# Patient Record
Sex: FEMALE | Race: OTHER | HISPANIC OR LATINO
[De-identification: names, ages, dates, MRNs, and addresses within clinical notes are randomized per-mention and may not be internally consistent; named-entity substitution may affect disease eponyms.]

---

## 2017-06-27 ENCOUNTER — APPOINTMENT (OUTPATIENT)
Dept: SURGICAL ONCOLOGY | Facility: CLINIC | Age: 22
End: 2017-06-27

## 2017-06-28 PROBLEM — Z00.00 ENCOUNTER FOR PREVENTIVE HEALTH EXAMINATION: Status: ACTIVE | Noted: 2017-06-28

## 2017-07-18 ENCOUNTER — APPOINTMENT (OUTPATIENT)
Dept: SURGICAL ONCOLOGY | Facility: CLINIC | Age: 22
End: 2017-07-18

## 2017-07-20 ENCOUNTER — OUTPATIENT (OUTPATIENT)
Dept: OUTPATIENT SERVICES | Facility: HOSPITAL | Age: 22
LOS: 1 days | End: 2017-07-20
Payer: MEDICAID

## 2017-07-20 VITALS
DIASTOLIC BLOOD PRESSURE: 76 MMHG | RESPIRATION RATE: 18 BRPM | SYSTOLIC BLOOD PRESSURE: 113 MMHG | WEIGHT: 160.06 LBS | TEMPERATURE: 98 F | OXYGEN SATURATION: 100 % | HEIGHT: 64 IN | HEART RATE: 74 BPM

## 2017-07-20 DIAGNOSIS — Z01.818 ENCOUNTER FOR OTHER PREPROCEDURAL EXAMINATION: ICD-10-CM

## 2017-07-20 DIAGNOSIS — C73 MALIGNANT NEOPLASM OF THYROID GLAND: ICD-10-CM

## 2017-07-20 LAB
ANION GAP SERPL CALC-SCNC: 5 MMOL/L — SIGNIFICANT CHANGE UP (ref 5–17)
APTT BLD: 32.7 SEC — SIGNIFICANT CHANGE UP (ref 27.5–37.4)
BUN SERPL-MCNC: 10 MG/DL — SIGNIFICANT CHANGE UP (ref 7–18)
CALCIUM SERPL-MCNC: 8.6 MG/DL — SIGNIFICANT CHANGE UP (ref 8.4–10.5)
CHLORIDE SERPL-SCNC: 106 MMOL/L — SIGNIFICANT CHANGE UP (ref 96–108)
CO2 SERPL-SCNC: 27 MMOL/L — SIGNIFICANT CHANGE UP (ref 22–31)
CREAT SERPL-MCNC: 0.55 MG/DL — SIGNIFICANT CHANGE UP (ref 0.5–1.3)
GLUCOSE SERPL-MCNC: 84 MG/DL — SIGNIFICANT CHANGE UP (ref 70–99)
HCG SERPL-ACNC: <1 MIU/ML — SIGNIFICANT CHANGE UP
HCT VFR BLD CALC: 38.8 % — SIGNIFICANT CHANGE UP (ref 34.5–45)
HGB BLD-MCNC: 12.5 G/DL — SIGNIFICANT CHANGE UP (ref 11.5–15.5)
INR BLD: 1.04 RATIO — SIGNIFICANT CHANGE UP (ref 0.88–1.16)
MCHC RBC-ENTMCNC: 28.4 PG — SIGNIFICANT CHANGE UP (ref 27–34)
MCHC RBC-ENTMCNC: 32.1 GM/DL — SIGNIFICANT CHANGE UP (ref 32–36)
MCV RBC AUTO: 88.4 FL — SIGNIFICANT CHANGE UP (ref 80–100)
PLATELET # BLD AUTO: 209 K/UL — SIGNIFICANT CHANGE UP (ref 150–400)
POTASSIUM SERPL-MCNC: 4.1 MMOL/L — SIGNIFICANT CHANGE UP (ref 3.5–5.3)
POTASSIUM SERPL-SCNC: 4.1 MMOL/L — SIGNIFICANT CHANGE UP (ref 3.5–5.3)
PROTHROM AB SERPL-ACNC: 11.3 SEC — SIGNIFICANT CHANGE UP (ref 9.8–12.7)
RBC # BLD: 4.4 M/UL — SIGNIFICANT CHANGE UP (ref 3.8–5.2)
RBC # FLD: 12.4 % — SIGNIFICANT CHANGE UP (ref 10.3–14.5)
SODIUM SERPL-SCNC: 138 MMOL/L — SIGNIFICANT CHANGE UP (ref 135–145)
T3 SERPL-MCNC: 121 NG/DL — SIGNIFICANT CHANGE UP (ref 80–200)
T4 AB SER-ACNC: 9.6 UG/DL — SIGNIFICANT CHANGE UP (ref 4.6–12)
TSH SERPL-MCNC: 0.94 UU/ML — SIGNIFICANT CHANGE UP (ref 0.34–4.82)
WBC # BLD: 6.8 K/UL — SIGNIFICANT CHANGE UP (ref 3.8–10.5)
WBC # FLD AUTO: 6.8 K/UL — SIGNIFICANT CHANGE UP (ref 3.8–10.5)

## 2017-07-20 PROCEDURE — 80048 BASIC METABOLIC PNL TOTAL CA: CPT

## 2017-07-20 PROCEDURE — 85027 COMPLETE CBC AUTOMATED: CPT

## 2017-07-20 PROCEDURE — 71046 X-RAY EXAM CHEST 2 VIEWS: CPT

## 2017-07-20 PROCEDURE — 84436 ASSAY OF TOTAL THYROXINE: CPT

## 2017-07-20 PROCEDURE — 86900 BLOOD TYPING SEROLOGIC ABO: CPT

## 2017-07-20 PROCEDURE — 84702 CHORIONIC GONADOTROPIN TEST: CPT

## 2017-07-20 PROCEDURE — 84443 ASSAY THYROID STIM HORMONE: CPT

## 2017-07-20 PROCEDURE — 86901 BLOOD TYPING SEROLOGIC RH(D): CPT

## 2017-07-20 PROCEDURE — 84480 ASSAY TRIIODOTHYRONINE (T3): CPT

## 2017-07-20 PROCEDURE — 93005 ELECTROCARDIOGRAM TRACING: CPT

## 2017-07-20 PROCEDURE — 85610 PROTHROMBIN TIME: CPT

## 2017-07-20 PROCEDURE — 85730 THROMBOPLASTIN TIME PARTIAL: CPT

## 2017-07-20 PROCEDURE — 71020: CPT | Mod: 26

## 2017-07-20 PROCEDURE — G0463: CPT

## 2017-07-20 PROCEDURE — 86850 RBC ANTIBODY SCREEN: CPT

## 2017-07-20 RX ORDER — SODIUM CHLORIDE 9 MG/ML
3 INJECTION INTRAMUSCULAR; INTRAVENOUS; SUBCUTANEOUS EVERY 8 HOURS
Qty: 0 | Refills: 0 | Status: DISCONTINUED | OUTPATIENT
Start: 2017-07-21 | End: 2017-07-21

## 2017-07-20 NOTE — H&P PST ADULT - NECK DETAILS
Malignant neoplasm of thyroid gland - tenderness upon palpation to right neck area/no JVD/thyroid nodule/normal

## 2017-07-20 NOTE — H&P PST ADULT - HISTORY OF PRESENT ILLNESS
21year old female with pmhx of asthma as a child presents today for presurgical testing for scheduled total thyroidectomy on 7/21/17

## 2017-07-20 NOTE — H&P PST ADULT - FAMILY HISTORY
Father  Still living? Unknown  Family history of diabetes mellitus, Age at diagnosis: Age Unknown     Mother  Still living? Unknown  Family history of heart disease, Age at diagnosis: Age Unknown

## 2017-07-20 NOTE — H&P PST ADULT - NSANTHOSAYNRD_GEN_A_CORE
No. SILVERIO screening performed.  STOP BANG Legend: 0-2 = LOW Risk; 3-4 = INTERMEDIATE Risk; 5-8 = HIGH Risk

## 2017-07-21 ENCOUNTER — INPATIENT (INPATIENT)
Facility: HOSPITAL | Age: 22
LOS: 0 days | Discharge: HOME | DRG: 627 | End: 2017-07-22
Attending: SPECIALIST | Admitting: SPECIALIST
Payer: MEDICAID

## 2017-07-21 ENCOUNTER — TRANSCRIPTION ENCOUNTER (OUTPATIENT)
Age: 22
End: 2017-07-21

## 2017-07-21 ENCOUNTER — APPOINTMENT (OUTPATIENT)
Dept: SURGICAL ONCOLOGY | Facility: HOSPITAL | Age: 22
End: 2017-07-21
Payer: MEDICAID

## 2017-07-21 ENCOUNTER — RESULT REVIEW (OUTPATIENT)
Age: 22
End: 2017-07-21

## 2017-07-21 VITALS
OXYGEN SATURATION: 100 % | HEIGHT: 64 IN | SYSTOLIC BLOOD PRESSURE: 106 MMHG | RESPIRATION RATE: 16 BRPM | DIASTOLIC BLOOD PRESSURE: 63 MMHG | WEIGHT: 160.06 LBS | TEMPERATURE: 99 F | HEART RATE: 79 BPM

## 2017-07-21 DIAGNOSIS — Z01.818 ENCOUNTER FOR OTHER PREPROCEDURAL EXAMINATION: ICD-10-CM

## 2017-07-21 DIAGNOSIS — C73 MALIGNANT NEOPLASM OF THYROID GLAND: ICD-10-CM

## 2017-07-21 PROCEDURE — 60240 REMOVAL OF THYROID: CPT

## 2017-07-21 PROCEDURE — 88307 TISSUE EXAM BY PATHOLOGIST: CPT | Mod: 26

## 2017-07-21 RX ORDER — HYDROMORPHONE HYDROCHLORIDE 2 MG/ML
0.5 INJECTION INTRAMUSCULAR; INTRAVENOUS; SUBCUTANEOUS
Qty: 0 | Refills: 0 | Status: DISCONTINUED | OUTPATIENT
Start: 2017-07-21 | End: 2017-07-21

## 2017-07-21 RX ORDER — SODIUM CHLORIDE 9 MG/ML
3 INJECTION INTRAMUSCULAR; INTRAVENOUS; SUBCUTANEOUS EVERY 8 HOURS
Qty: 0 | Refills: 0 | Status: DISCONTINUED | OUTPATIENT
Start: 2017-07-21 | End: 2017-07-22

## 2017-07-21 RX ORDER — CALCITRIOL 0.5 UG/1
0.25 CAPSULE ORAL
Qty: 0 | Refills: 0 | Status: DISCONTINUED | OUTPATIENT
Start: 2017-07-21 | End: 2017-07-22

## 2017-07-21 RX ORDER — ONDANSETRON 8 MG/1
4 TABLET, FILM COATED ORAL ONCE
Qty: 0 | Refills: 0 | Status: DISCONTINUED | OUTPATIENT
Start: 2017-07-21 | End: 2017-07-21

## 2017-07-21 RX ORDER — OXYCODONE AND ACETAMINOPHEN 5; 325 MG/1; MG/1
1 TABLET ORAL EVERY 4 HOURS
Qty: 0 | Refills: 0 | Status: DISCONTINUED | OUTPATIENT
Start: 2017-07-21 | End: 2017-07-22

## 2017-07-21 RX ORDER — ACETAMINOPHEN 500 MG
1000 TABLET ORAL ONCE
Qty: 0 | Refills: 0 | Status: COMPLETED | OUTPATIENT
Start: 2017-07-21 | End: 2017-07-21

## 2017-07-21 RX ORDER — CALCIUM CARBONATE 500(1250)
1 TABLET ORAL
Qty: 0 | Refills: 0 | Status: DISCONTINUED | OUTPATIENT
Start: 2017-07-22 | End: 2017-07-22

## 2017-07-21 RX ORDER — SODIUM CHLORIDE 9 MG/ML
1000 INJECTION, SOLUTION INTRAVENOUS
Qty: 0 | Refills: 0 | Status: DISCONTINUED | OUTPATIENT
Start: 2017-07-21 | End: 2017-07-21

## 2017-07-21 RX ORDER — LEVOTHYROXINE SODIUM 125 MCG
150 TABLET ORAL DAILY
Qty: 0 | Refills: 0 | Status: DISCONTINUED | OUTPATIENT
Start: 2017-07-22 | End: 2017-07-22

## 2017-07-21 RX ADMIN — SODIUM CHLORIDE 100 MILLILITER(S): 9 INJECTION, SOLUTION INTRAVENOUS at 14:45

## 2017-07-21 RX ADMIN — HYDROMORPHONE HYDROCHLORIDE 0.5 MILLIGRAM(S): 2 INJECTION INTRAMUSCULAR; INTRAVENOUS; SUBCUTANEOUS at 14:18

## 2017-07-21 RX ADMIN — CALCITRIOL 0.25 MICROGRAM(S): 0.5 CAPSULE ORAL at 17:53

## 2017-07-21 RX ADMIN — OXYCODONE AND ACETAMINOPHEN 1 TABLET(S): 5; 325 TABLET ORAL at 19:44

## 2017-07-21 RX ADMIN — HYDROMORPHONE HYDROCHLORIDE 0.5 MILLIGRAM(S): 2 INJECTION INTRAMUSCULAR; INTRAVENOUS; SUBCUTANEOUS at 14:48

## 2017-07-21 RX ADMIN — SODIUM CHLORIDE 3 MILLILITER(S): 9 INJECTION INTRAMUSCULAR; INTRAVENOUS; SUBCUTANEOUS at 09:30

## 2017-07-21 RX ADMIN — OXYCODONE AND ACETAMINOPHEN 1 TABLET(S): 5; 325 TABLET ORAL at 20:30

## 2017-07-21 RX ADMIN — Medication 400 MILLIGRAM(S): at 11:30

## 2017-07-21 RX ADMIN — Medication 1000 MILLIGRAM(S): at 12:00

## 2017-07-21 RX ADMIN — SODIUM CHLORIDE 3 MILLILITER(S): 9 INJECTION INTRAMUSCULAR; INTRAVENOUS; SUBCUTANEOUS at 21:27

## 2017-07-21 NOTE — PROGRESS NOTE ADULT - SUBJECTIVE AND OBJECTIVE BOX
Surgery Post-op Check    Patient seen and examined. She is POD #0 from total thyroidectomy. She reports doing well, has some pain that is controlled with current regimen, is tolerating clear liquid diet, denies nausea or vomiting, has ambulated to bathroom, voiding without difficultly, no BM or flatus. She reports some pain with speaking but normal sounding voice. No respiratory difficulty.    Vital Signs Last 24 Hrs  T(C): 36.7 (21 Jul 2017 15:44), Max: 37.3 (21 Jul 2017 13:26)  T(F): 98.1 (21 Jul 2017 15:44), Max: 99.1 (21 Jul 2017 13:26)  HR: 93 (21 Jul 2017 15:44) (77 - 93)  BP: 105/58 (21 Jul 2017 15:44) (100/65 - 112/67)  BP(mean): 80 (21 Jul 2017 14:48) (76 - 84)  RR: 15 (21 Jul 2017 15:44) (14 - 19)  SpO2: 100% (21 Jul 2017 15:44) (100% - 100%)    PE:  Gen- AAO x3, NAD  Neck- dressing in place, steri-strips clean dry, intact, no signs of hematoma, no surrounding erythema or discharge  Chest- CTAB, RRR  Abd- soft, NT/ND  Extrem- no calf tenderness    A/P  20yo F s/p total thyroidectomy, doing well  - cont w clear liquids  - calcitriol, synthroid, calcium  - encouraged OOB/ IS

## 2017-07-21 NOTE — PATIENT PROFILE ADULT. - TEACHING/LEARNING LEARNING PREFERENCES
skill demonstration/computer/internet/audio/video/verbal instruction/group instruction/written material/individual instruction/pictorial

## 2017-07-21 NOTE — BRIEF OPERATIVE NOTE - OPERATION/FINDINGS
palpable mass in right inferior pole, recurrent laryngeal nerve identified bilaterally and protected

## 2017-07-22 ENCOUNTER — TRANSCRIPTION ENCOUNTER (OUTPATIENT)
Age: 22
End: 2017-07-22

## 2017-07-22 VITALS
OXYGEN SATURATION: 100 % | RESPIRATION RATE: 16 BRPM | HEART RATE: 76 BPM | TEMPERATURE: 98 F | DIASTOLIC BLOOD PRESSURE: 65 MMHG | SYSTOLIC BLOOD PRESSURE: 102 MMHG

## 2017-07-22 DIAGNOSIS — G89.18 OTHER ACUTE POSTPROCEDURAL PAIN: ICD-10-CM

## 2017-07-22 DIAGNOSIS — E89.0 POSTPROCEDURAL HYPOTHYROIDISM: ICD-10-CM

## 2017-07-22 DIAGNOSIS — C73 MALIGNANT NEOPLASM OF THYROID GLAND: ICD-10-CM

## 2017-07-22 PROCEDURE — 99233 SBSQ HOSP IP/OBS HIGH 50: CPT

## 2017-07-22 RX ORDER — CALCIUM CARBONATE 500(1250)
1 TABLET ORAL
Qty: 0 | Refills: 0 | COMMUNITY
Start: 2017-07-22

## 2017-07-22 RX ORDER — CALCIUM CARBONATE 500(1250)
1 TABLET ORAL
Qty: 60 | Refills: 0 | OUTPATIENT
Start: 2017-07-22 | End: 2017-08-21

## 2017-07-22 RX ORDER — OXYCODONE HYDROCHLORIDE 5 MG/1
1 TABLET ORAL
Qty: 18 | Refills: 0 | OUTPATIENT
Start: 2017-07-22 | End: 2017-07-25

## 2017-07-22 RX ORDER — CALCITRIOL 0.5 UG/1
1 CAPSULE ORAL
Qty: 60 | Refills: 0 | OUTPATIENT
Start: 2017-07-22 | End: 2017-08-21

## 2017-07-22 RX ORDER — LEVOTHYROXINE SODIUM 125 MCG
1 TABLET ORAL
Qty: 30 | Refills: 0 | OUTPATIENT
Start: 2017-07-22 | End: 2017-08-21

## 2017-07-22 RX ORDER — OXYCODONE HYDROCHLORIDE 5 MG/1
1 TABLET ORAL
Qty: 0 | Refills: 0 | COMMUNITY
Start: 2017-07-22

## 2017-07-22 RX ORDER — LEVOTHYROXINE SODIUM 125 MCG
1 TABLET ORAL
Qty: 0 | Refills: 0 | COMMUNITY
Start: 2017-07-22

## 2017-07-22 RX ORDER — CALCITRIOL 0.5 UG/1
1 CAPSULE ORAL
Qty: 0 | Refills: 0 | COMMUNITY
Start: 2017-07-22

## 2017-07-22 RX ADMIN — OXYCODONE AND ACETAMINOPHEN 1 TABLET(S): 5; 325 TABLET ORAL at 06:15

## 2017-07-22 RX ADMIN — Medication 150 MICROGRAM(S): at 06:10

## 2017-07-22 RX ADMIN — SODIUM CHLORIDE 3 MILLILITER(S): 9 INJECTION INTRAMUSCULAR; INTRAVENOUS; SUBCUTANEOUS at 06:21

## 2017-07-22 RX ADMIN — OXYCODONE AND ACETAMINOPHEN 1 TABLET(S): 5; 325 TABLET ORAL at 07:01

## 2017-07-22 RX ADMIN — CALCITRIOL 0.25 MICROGRAM(S): 0.5 CAPSULE ORAL at 06:10

## 2017-07-22 RX ADMIN — Medication 1 TABLET(S): at 06:11

## 2017-07-22 NOTE — DISCHARGE NOTE ADULT - PLAN OF CARE
Pain management pain medication as needed Pain management, wound healing pain medication as needed, Follow up with Dr. Castillo in 1 week in office

## 2017-07-22 NOTE — PROGRESS NOTE ADULT - SUBJECTIVE AND OBJECTIVE BOX
Vital Signs Last 24 Hrs  T(C): 36.9 (22 Jul 2017 05:46), Max: 37.3 (21 Jul 2017 13:26)  T(F): 98.5 (22 Jul 2017 05:46), Max: 99.1 (21 Jul 2017 13:26)  HR: 76 (22 Jul 2017 05:46) (74 - 93)  BP: 102/65 (22 Jul 2017 05:46) (100/65 - 117/78)  BP(mean): 80 (21 Jul 2017 14:48) (76 - 84)  RR: 16 (22 Jul 2017 05:46) (14 - 19)  SpO2: 100% (22 Jul 2017 05:46) (99% - 100%)    I&O's Detail    21 Jul 2017 07:01  -  22 Jul 2017 07:00  --------------------------------------------------------  IN:    Lactated Ringers IV Bolus: 1400 mL    lactated ringers.: 100 mL  Total IN: 1500 mL    OUT:  Total OUT: 0 mL    Total NET: 1500 mL                                12.5   6.8   )-----------( 209      ( 20 Jul 2017 11:51 )             38.8       07-20    138  |  106  |  10  ----------------------------<  84  4.1   |  27  |  0.55    Ca    8.6      20 Jul 2017 11:51        PT/INR - ( 20 Jul 2017 11:51 )   PT: 11.3 sec;   INR: 1.04 ratio         PTT - ( 20 Jul 2017 11:51 )  PTT:32.7 sec    doing well  negative chvostek    PLAN:  Discharge home today on synthroid, Vit D and calcium  Instructions given

## 2017-07-22 NOTE — PROGRESS NOTE ADULT - SUBJECTIVE AND OBJECTIVE BOX
Surgery Daily Note    Patient seen and examined at bedside, both parents at bedside as well. She is POD #1 from total thyroidectomy for papillary thyroid cancer. She reports doing well with no issues overnight, still having some pain at surgical site but improved from last night. She has been ambulating, voiding, no BM, denies nausea or vomiting, tolerating clear liquid diet, normal voice, only with some pain with speaking. No SOB or respiratory difficulty    Vital Signs Last 24 Hrs:  T(C): 36.9 (22 Jul 2017 05:46), Max: 37.3 (21 Jul 2017 13:26)  T(F): 98.5 (22 Jul 2017 05:46), Max: 99.1 (21 Jul 2017 13:26)  HR: 76 (22 Jul 2017 05:46) (74 - 93)  BP: 102/65 (22 Jul 2017 05:46) (100/65 - 117/78)  BP(mean): 80 (21 Jul 2017 14:48) (76 - 84)  RR: 16 (22 Jul 2017 05:46) (14 - 19)  SpO2: 100% (22 Jul 2017 05:46) (99% - 100%)    PE:  Gen- AAO x3, NAD  Neck- dressing remains in place, steri strips are clean and dry, no surrounding erythema or discharge, no hematoma, (+) yasmany-incisional tenderness  Chest- CTAB, equal chest rise, RRR  Abd- soft, NT/ND  Extrem- no calf tenderness    A/P  22 yo F POD #1 from total thyroidectomy, doing well  - cont current pain regimen  - cont oscal, synthroid, calcitriol  - encouraged OOB/ IS  - plan for d/c later today

## 2017-07-22 NOTE — DISCHARGE NOTE ADULT - CARE PROVIDER_API CALL
Ryan Castillo (MD), Surgery  [id number] [address] [place] [address]  Phone: [phone]  Fax: [phone]

## 2017-07-22 NOTE — DISCHARGE NOTE ADULT - CARE PLAN
Principal Discharge DX:	Thyroid cancer  Goal:	Pain management  Instructions for follow-up, activity and diet:	pain medication as needed Principal Discharge DX:	Thyroid cancer  Goal:	Pain management, wound healing  Instructions for follow-up, activity and diet:	pain medication as needed, Follow up with Dr. Castillo in 1 week in office

## 2017-07-22 NOTE — PROGRESS NOTE ADULT - SUBJECTIVE AND OBJECTIVE BOX
Chief Complaint: neck pain    HPI:   21y Female s/p total thyroidectomy for papillary ca, POD#1.  Pt is lying in bed, NAD. + neck discomfort.  Pt complains of pain on swallowing.  Pt able to drink liquids this am. No sob or chest pain.  For discharge home today.        PAIN SCORE:     4/10  SCALE USED: (1-10 VNRS)    Allergies    penicillin (Anaphylaxis)  PINEAPLLE (Anaphylaxis)    Intolerances      MEDICATIONS  (STANDING):  sodium chloride 0.9% lock flush 3 milliLiter(s) IV Push every 8 hours  levothyroxine 150 MICROGram(s) Oral daily  calcitriol   Capsule 0.25 MICROGram(s) Oral two times a day  calcium carbonate 1250 mG (OsCal) 1 Tablet(s) Oral two times a day    MEDICATIONS  (PRN):  oxyCODONE    5 mG/acetaminophen 325 mG 1 Tablet(s) Oral every 4 hours PRN Moderate Pain      PHYSICAL EXAM:    Vital Signs Last 24 Hrs  T(C): 36.9 (22 Jul 2017 05:46), Max: 37.3 (21 Jul 2017 13:26)  T(F): 98.5 (22 Jul 2017 05:46), Max: 99.1 (21 Jul 2017 13:26)  HR: 76 (22 Jul 2017 05:46) (74 - 93)  BP: 102/65 (22 Jul 2017 05:46) (100/65 - 117/78)  BP(mean): 80 (21 Jul 2017 14:48) (76 - 84)  RR: 16 (22 Jul 2017 05:46) (14 - 19)  SpO2: 100% (22 Jul 2017 05:46) (99% - 100%)             LABS:                          12.5   6.8   )-----------( 209      ( 20 Jul 2017 11:51 )             38.8     07-20    138  |  106  |  10  ----------------------------<  84  4.1   |  27  |  0.55    Ca    8.6      20 Jul 2017 11:51      PT/INR - ( 20 Jul 2017 11:51 )   PT: 11.3 sec;   INR: 1.04 ratio         PTT - ( 20 Jul 2017 11:51 )  PTT:32.7 sec      Drug Screen:        RADIOLOGY:

## 2017-07-22 NOTE — DISCHARGE NOTE ADULT - PATIENT PORTAL LINK FT
“You can access the FollowHealth Patient Portal, offered by [facility], by registering with the following website: [email or web address]”

## 2017-07-22 NOTE — DISCHARGE NOTE ADULT - INSTRUCTIONS
Recommend starting with soft diet, may advance diet as tolerated. Light activity until follow up appointment

## 2017-07-22 NOTE — DISCHARGE NOTE ADULT - MEDICATION SUMMARY - MEDICATIONS TO TAKE
I will START or STAY ON the medications listed below when I get home from the hospital:    acetaminophen-oxycodone 325 mg-5 mg oral tablet  -- 1 tab(s) by mouth every 4 hours, As needed, Moderate Pain  -- Indication: For pain medication as needed    calcium carbonate 1250 mg (500 mg elemental calcium) oral tablet  -- 1 tab(s) by mouth 2 times a day  -- Indication: For after thyroid removal    levothyroxine 150 mcg (0.15 mg) oral tablet  -- 1 tab(s) by mouth once a day  -- Indication: For after thyroid removal    calcitriol 0.25 mcg oral capsule  -- 1 cap(s) by mouth 2 times a day  -- Indication: For after thyroid removal I will START or STAY ON the medications listed below when I get home from the hospital:    acetaminophen-oxycodone 325 mg-5 mg oral tablet  -- 1 tab(s) by mouth every 4 hours, As needed, Moderate Pain MDD:4  -- Indication: For pain     calcium carbonate 1250 mg (500 mg elemental calcium) oral tablet  -- 1 tab(s) by mouth 2 times a day  -- Indication: For s/p thyroidectomy     levothyroxine 150 mcg (0.15 mg) oral tablet  -- 1 tab(s) by mouth once a day  -- Indication: For s/p thyroidectomy     calcitriol 0.25 mcg oral capsule  -- 1 cap(s) by mouth 2 times a day  -- Indication: For s/p thyroidectomy

## 2017-07-22 NOTE — DISCHARGE NOTE ADULT - HOSPITAL COURSE
Patient was admitted and taken to operating room for elective total thyroidectomy. Post op diet was advanced which she tolerated, she ambulated and was voiding on her own, pain controlled on oral pain medication. She was started on calcium, synthroid, and calcitriol post op. No issues overnight and was cleared for discharge.

## 2017-07-25 DIAGNOSIS — G89.18 OTHER ACUTE POSTPROCEDURAL PAIN: ICD-10-CM

## 2017-07-25 DIAGNOSIS — C73 MALIGNANT NEOPLASM OF THYROID GLAND: ICD-10-CM

## 2017-07-25 DIAGNOSIS — Z88.0 ALLERGY STATUS TO PENICILLIN: ICD-10-CM

## 2017-07-25 DIAGNOSIS — Z91.018 ALLERGY TO OTHER FOODS: ICD-10-CM

## 2017-07-25 LAB — SURGICAL PATHOLOGY FINAL REPORT - CH: SIGNIFICANT CHANGE UP

## 2017-08-01 ENCOUNTER — APPOINTMENT (OUTPATIENT)
Dept: SURGICAL ONCOLOGY | Facility: CLINIC | Age: 22
End: 2017-08-01
Payer: MEDICAID

## 2017-08-01 PROCEDURE — 99024 POSTOP FOLLOW-UP VISIT: CPT

## 2017-11-14 ENCOUNTER — APPOINTMENT (OUTPATIENT)
Dept: SURGICAL ONCOLOGY | Facility: CLINIC | Age: 22
End: 2017-11-14
Payer: MEDICAID

## 2017-11-14 PROCEDURE — 99214 OFFICE O/P EST MOD 30 MIN: CPT

## 2017-12-15 PROCEDURE — 86850 RBC ANTIBODY SCREEN: CPT

## 2017-12-15 PROCEDURE — 88307 TISSUE EXAM BY PATHOLOGIST: CPT

## 2017-12-15 PROCEDURE — 86900 BLOOD TYPING SEROLOGIC ABO: CPT

## 2017-12-15 PROCEDURE — 36415 COLL VENOUS BLD VENIPUNCTURE: CPT

## 2017-12-15 PROCEDURE — C1889: CPT

## 2017-12-15 PROCEDURE — 86901 BLOOD TYPING SEROLOGIC RH(D): CPT

## 2018-03-05 ENCOUNTER — EMERGENCY (EMERGENCY)
Facility: HOSPITAL | Age: 23
LOS: 1 days | Discharge: HOME | End: 2018-03-05
Attending: EMERGENCY MEDICINE
Payer: MEDICAID

## 2018-03-05 VITALS
OXYGEN SATURATION: 100 % | RESPIRATION RATE: 18 BRPM | SYSTOLIC BLOOD PRESSURE: 114 MMHG | HEART RATE: 73 BPM | DIASTOLIC BLOOD PRESSURE: 64 MMHG | TEMPERATURE: 98 F

## 2018-03-05 VITALS
DIASTOLIC BLOOD PRESSURE: 66 MMHG | OXYGEN SATURATION: 100 % | TEMPERATURE: 98 F | RESPIRATION RATE: 20 BRPM | HEIGHT: 62.99 IN | SYSTOLIC BLOOD PRESSURE: 116 MMHG | WEIGHT: 156.97 LBS | HEART RATE: 86 BPM

## 2018-03-05 PROCEDURE — 96375 TX/PRO/DX INJ NEW DRUG ADDON: CPT | Mod: 76

## 2018-03-05 PROCEDURE — 99284 EMERGENCY DEPT VISIT MOD MDM: CPT | Mod: 25

## 2018-03-05 PROCEDURE — 99285 EMERGENCY DEPT VISIT HI MDM: CPT

## 2018-03-05 PROCEDURE — 96374 THER/PROPH/DIAG INJ IV PUSH: CPT

## 2018-03-05 PROCEDURE — 70450 CT HEAD/BRAIN W/O DYE: CPT | Mod: 26

## 2018-03-05 PROCEDURE — 70450 CT HEAD/BRAIN W/O DYE: CPT

## 2018-03-05 RX ORDER — KETOROLAC TROMETHAMINE 30 MG/ML
30 SYRINGE (ML) INJECTION ONCE
Qty: 0 | Refills: 0 | Status: DISCONTINUED | OUTPATIENT
Start: 2018-03-05 | End: 2018-03-05

## 2018-03-05 RX ORDER — MAGNESIUM SULFATE 500 MG/ML
1 VIAL (ML) INJECTION ONCE
Qty: 0 | Refills: 0 | Status: COMPLETED | OUTPATIENT
Start: 2018-03-05 | End: 2018-03-05

## 2018-03-05 RX ORDER — ACETAMINOPHEN 500 MG
975 TABLET ORAL ONCE
Qty: 0 | Refills: 0 | Status: COMPLETED | OUTPATIENT
Start: 2018-03-05 | End: 2018-03-05

## 2018-03-05 RX ORDER — METOCLOPRAMIDE HCL 10 MG
10 TABLET ORAL ONCE
Qty: 0 | Refills: 0 | Status: COMPLETED | OUTPATIENT
Start: 2018-03-05 | End: 2018-03-05

## 2018-03-05 RX ADMIN — Medication 30 MILLIGRAM(S): at 13:06

## 2018-03-05 RX ADMIN — Medication 10 MILLIGRAM(S): at 15:39

## 2018-03-05 RX ADMIN — Medication 100 GRAM(S): at 11:56

## 2018-03-05 RX ADMIN — Medication 30 MILLIGRAM(S): at 11:55

## 2018-03-05 RX ADMIN — Medication 975 MILLIGRAM(S): at 15:39

## 2018-03-05 NOTE — ED PROVIDER NOTE - CRANIAL NERVE AND PUPILLARY EXAM
cranial nerves 2-12 intact/tongue is midline/central and peripheral vision intact/extra-ocular movements intact/corneal reflex intact

## 2018-03-05 NOTE — ED PROVIDER NOTE - MEDICAL DECISION MAKING DETAILS
pain resolved.   Discussed anticipatory guidance and return precautions. Discussed results and gave copy to pt.  Dc with outpt follow up.

## 2018-03-05 NOTE — ED PROVIDER NOTE - OBJECTIVE STATEMENT
21 y/o F pt w/ PMHx of Asthma presents to ED c/o headache since February. Pt describes her headache as a pulsating sensation that was 10/10 in severity when she arrived; pt's pain has improved to 3 or 4/10 in ED. Pt denies fever, chills, or any other complaints. Pt reports no meningismus or falls. Pt states that she took Tylenol to no relief. Pt is allergic to Penicillin (Anaphylaxis)

## 2018-03-13 ENCOUNTER — APPOINTMENT (OUTPATIENT)
Dept: SURGICAL ONCOLOGY | Facility: CLINIC | Age: 23
End: 2018-03-13
Payer: MEDICAID

## 2018-03-13 VITALS
HEART RATE: 78 BPM | RESPIRATION RATE: 18 BRPM | SYSTOLIC BLOOD PRESSURE: 115 MMHG | HEIGHT: 66 IN | BODY MASS INDEX: 25.39 KG/M2 | OXYGEN SATURATION: 99 % | WEIGHT: 158 LBS | DIASTOLIC BLOOD PRESSURE: 75 MMHG

## 2018-03-13 PROCEDURE — 99214 OFFICE O/P EST MOD 30 MIN: CPT

## 2018-03-13 RX ORDER — B-COMPLEX WITH VITAMIN C
1250 (500 CA) TABLET ORAL TWICE DAILY
Qty: 180 | Refills: 3 | Status: ACTIVE | COMMUNITY
Start: 2018-03-13 | End: 1900-01-01

## 2018-03-13 RX ORDER — LEVOTHYROXINE SODIUM 100 UG/1
100 TABLET ORAL
Refills: 0 | Status: ACTIVE | COMMUNITY

## 2018-05-08 ENCOUNTER — APPOINTMENT (OUTPATIENT)
Dept: ENDOCRINOLOGY | Facility: CLINIC | Age: 23
End: 2018-05-08

## 2018-07-03 ENCOUNTER — APPOINTMENT (OUTPATIENT)
Dept: SURGICAL ONCOLOGY | Facility: CLINIC | Age: 23
End: 2018-07-03
Payer: MEDICAID

## 2018-07-03 VITALS
DIASTOLIC BLOOD PRESSURE: 80 MMHG | WEIGHT: 158 LBS | HEART RATE: 75 BPM | BODY MASS INDEX: 25.39 KG/M2 | HEIGHT: 66 IN | TEMPERATURE: 98.4 F | SYSTOLIC BLOOD PRESSURE: 110 MMHG

## 2018-07-03 DIAGNOSIS — Z85.850 PERSONAL HISTORY OF MALIGNANT NEOPLASM OF THYROID: ICD-10-CM

## 2018-07-03 PROCEDURE — 99214 OFFICE O/P EST MOD 30 MIN: CPT

## 2018-07-09 ENCOUNTER — EMERGENCY (EMERGENCY)
Facility: HOSPITAL | Age: 23
LOS: 1 days | Discharge: HOME | End: 2018-07-09
Attending: EMERGENCY MEDICINE
Payer: MEDICAID

## 2018-07-09 VITALS
OXYGEN SATURATION: 100 % | DIASTOLIC BLOOD PRESSURE: 83 MMHG | TEMPERATURE: 98 F | RESPIRATION RATE: 16 BRPM | HEART RATE: 75 BPM | WEIGHT: 149.91 LBS | SYSTOLIC BLOOD PRESSURE: 114 MMHG | HEIGHT: 63 IN

## 2018-07-09 VITALS
DIASTOLIC BLOOD PRESSURE: 75 MMHG | SYSTOLIC BLOOD PRESSURE: 114 MMHG | TEMPERATURE: 98 F | RESPIRATION RATE: 20 BRPM | HEART RATE: 74 BPM | OXYGEN SATURATION: 100 %

## 2018-07-09 LAB
ALBUMIN SERPL ELPH-MCNC: 3.7 G/DL — SIGNIFICANT CHANGE UP (ref 3.5–5)
ALP SERPL-CCNC: 65 U/L — SIGNIFICANT CHANGE UP (ref 40–120)
ALT FLD-CCNC: 16 U/L DA — SIGNIFICANT CHANGE UP (ref 10–60)
ANION GAP SERPL CALC-SCNC: 7 MMOL/L — SIGNIFICANT CHANGE UP (ref 5–17)
AST SERPL-CCNC: 8 U/L — LOW (ref 10–40)
BASOPHILS # BLD AUTO: 0 K/UL — SIGNIFICANT CHANGE UP (ref 0–0.2)
BASOPHILS NFR BLD AUTO: 0.6 % — SIGNIFICANT CHANGE UP (ref 0–2)
BILIRUB SERPL-MCNC: 0.2 MG/DL — SIGNIFICANT CHANGE UP (ref 0.2–1.2)
BUN SERPL-MCNC: 8 MG/DL — SIGNIFICANT CHANGE UP (ref 7–18)
CALCIUM SERPL-MCNC: 7.2 MG/DL — LOW (ref 8.4–10.5)
CHLORIDE SERPL-SCNC: 105 MMOL/L — SIGNIFICANT CHANGE UP (ref 96–108)
CO2 SERPL-SCNC: 30 MMOL/L — SIGNIFICANT CHANGE UP (ref 22–31)
CREAT SERPL-MCNC: 0.65 MG/DL — SIGNIFICANT CHANGE UP (ref 0.5–1.3)
D DIMER BLD IA.RAPID-MCNC: 153 NG/ML DDU — SIGNIFICANT CHANGE UP
EOSINOPHIL # BLD AUTO: 0.1 K/UL — SIGNIFICANT CHANGE UP (ref 0–0.5)
EOSINOPHIL NFR BLD AUTO: 1.8 % — SIGNIFICANT CHANGE UP (ref 0–6)
GLUCOSE SERPL-MCNC: 83 MG/DL — SIGNIFICANT CHANGE UP (ref 70–99)
HCT VFR BLD CALC: 38.8 % — SIGNIFICANT CHANGE UP (ref 34.5–45)
HGB BLD-MCNC: 12.3 G/DL — SIGNIFICANT CHANGE UP (ref 11.5–15.5)
LYMPHOCYTES # BLD AUTO: 2.8 K/UL — SIGNIFICANT CHANGE UP (ref 1–3.3)
LYMPHOCYTES # BLD AUTO: 38.2 % — SIGNIFICANT CHANGE UP (ref 13–44)
MCHC RBC-ENTMCNC: 27.2 PG — SIGNIFICANT CHANGE UP (ref 27–34)
MCHC RBC-ENTMCNC: 31.7 GM/DL — LOW (ref 32–36)
MCV RBC AUTO: 85.8 FL — SIGNIFICANT CHANGE UP (ref 80–100)
MONOCYTES # BLD AUTO: 0.5 K/UL — SIGNIFICANT CHANGE UP (ref 0–0.9)
MONOCYTES NFR BLD AUTO: 7.1 % — SIGNIFICANT CHANGE UP (ref 2–14)
NEUTROPHILS # BLD AUTO: 3.9 K/UL — SIGNIFICANT CHANGE UP (ref 1.8–7.4)
NEUTROPHILS NFR BLD AUTO: 52.3 % — SIGNIFICANT CHANGE UP (ref 43–77)
PLATELET # BLD AUTO: 266 K/UL — SIGNIFICANT CHANGE UP (ref 150–400)
POTASSIUM SERPL-MCNC: 4.2 MMOL/L — SIGNIFICANT CHANGE UP (ref 3.5–5.3)
POTASSIUM SERPL-SCNC: 4.2 MMOL/L — SIGNIFICANT CHANGE UP (ref 3.5–5.3)
PROT SERPL-MCNC: 7.7 G/DL — SIGNIFICANT CHANGE UP (ref 6–8.3)
RBC # BLD: 4.52 M/UL — SIGNIFICANT CHANGE UP (ref 3.8–5.2)
RBC # FLD: 12.8 % — SIGNIFICANT CHANGE UP (ref 10.3–14.5)
SODIUM SERPL-SCNC: 142 MMOL/L — SIGNIFICANT CHANGE UP (ref 135–145)
T4 AB SER-ACNC: 12.4 UG/DL — HIGH (ref 4.6–12)
T4 FREE SERPL-MCNC: 1.7 NG/DL
T4 SERPL-MCNC: 10.3 UG/DL
THYROGLOB AB SERPL-ACNC: <20 IU/ML
THYROGLOB SERPL-MCNC: <0.2 NG/ML
TSH SERPL-ACNC: 0.09 UIU/ML
TSH SERPL-MCNC: 0.19 UU/ML — LOW (ref 0.34–4.82)
WBC # BLD: 7.4 K/UL — SIGNIFICANT CHANGE UP (ref 3.8–10.5)
WBC # FLD AUTO: 7.4 K/UL — SIGNIFICANT CHANGE UP (ref 3.8–10.5)

## 2018-07-09 PROCEDURE — 99283 EMERGENCY DEPT VISIT LOW MDM: CPT

## 2018-07-09 PROCEDURE — 71045 X-RAY EXAM CHEST 1 VIEW: CPT | Mod: 26

## 2018-07-09 NOTE — ED PROVIDER NOTE - CHPI ED SYMPTOMS NEG
no b/l leg swelling, no nasal congestion, no runny nose, no sore throat, no pleuritic chest pain/no chest pain/no cough

## 2018-07-09 NOTE — ED PROVIDER NOTE - OBJECTIVE STATEMENT
21 y/o F pt with PMHx of Childhood Asthma and Thyroid Nodule (s/p surgery) and PSHx of Thyroid Surgery (removal of thyroid nodule) presents to ED c/o palpitations and subjective SOB x1 day. Pt reports recent change in her thyroid medication from 150 micrograms per day to 100 micrograms per day approximately x1-2 months ago. Pt denies CP, b/l leg swelling, nasal congestion, runny nose, sore throat, cough, pleuritic CP (pleuritic sx's), or any other complaints. Pt also denies currently taking oral contraception daily, or having had similar sx's in the past. Allergies: Penicillin (anaphylaxis).

## 2018-07-09 NOTE — ED PROVIDER NOTE - PROGRESS NOTE DETAILS
reviewed results w pt, feeling better, advised return precautions. has outpt endo, will see promptly, calcium supp.

## 2018-07-09 NOTE — ED PROVIDER NOTE - MEDICAL DECISION MAKING DETAILS
23 y/o F pt presents with subjective SOB and palpitations. Will do D-dimer to r/o PE, will perform EKG screening, will basic blood work and CXR, and will reassess. Do not suspect ACS.

## 2018-07-10 DIAGNOSIS — Z98.890 OTHER SPECIFIED POSTPROCEDURAL STATES: Chronic | ICD-10-CM

## 2018-07-10 LAB
CALCIUM SERPL-MCNC: 7.4 MG/DL — LOW (ref 8.4–10.5)
PTH-INTACT FLD-MCNC: 12 PG/ML — LOW (ref 15–65)

## 2018-07-10 PROCEDURE — 71045 X-RAY EXAM CHEST 1 VIEW: CPT

## 2018-07-10 PROCEDURE — 85027 COMPLETE CBC AUTOMATED: CPT

## 2018-07-10 PROCEDURE — 84443 ASSAY THYROID STIM HORMONE: CPT

## 2018-07-10 PROCEDURE — 99283 EMERGENCY DEPT VISIT LOW MDM: CPT | Mod: 25

## 2018-07-10 PROCEDURE — 85379 FIBRIN DEGRADATION QUANT: CPT

## 2018-07-10 PROCEDURE — 80053 COMPREHEN METABOLIC PANEL: CPT

## 2018-07-10 PROCEDURE — 83970 ASSAY OF PARATHORMONE: CPT

## 2018-07-10 PROCEDURE — 82310 ASSAY OF CALCIUM: CPT

## 2018-07-10 PROCEDURE — 84436 ASSAY OF TOTAL THYROXINE: CPT

## 2018-07-10 PROCEDURE — 93005 ELECTROCARDIOGRAM TRACING: CPT

## 2018-08-06 PROBLEM — E04.1 NONTOXIC SINGLE THYROID NODULE: Chronic | Status: ACTIVE | Noted: 2018-07-10

## 2018-08-06 PROBLEM — J45.909 UNSPECIFIED ASTHMA, UNCOMPLICATED: Chronic | Status: ACTIVE | Noted: 2017-07-20

## 2018-08-07 ENCOUNTER — APPOINTMENT (OUTPATIENT)
Dept: SURGICAL ONCOLOGY | Facility: CLINIC | Age: 23
End: 2018-08-07

## 2018-08-08 NOTE — H&P PST ADULT - BREASTS
Is This A New Presentation, Or A Follow-Up?: Skin Lesion
How Severe Is Your Skin Lesion?: mild
Has Your Skin Lesion Been Treated?: not been treated
not examined

## 2019-01-07 PROBLEM — C73 THYROID CANCER: Status: ACTIVE | Noted: 2018-03-13

## 2019-01-07 NOTE — HISTORY OF PRESENT ILLNESS
[de-identified] : 22 year-old female presents for follow up.  She is status post a total thyroidectomy in July 2017.  Final pathology was consistent with papillary thyroid carcinoma, encapsulated (2 cm) in the right thyroid lobe (T1aN0).\par \par At the prior visit on 3/13/18, she had c/o some paresthesias in her right forehead and periorbital region, and her family states that her face often twitches when she sleeps.  Her blood work demonstrated hypocalcemia and she was started on Calcium supplements. \par \par Labs 7/2018: TSH low (0.09 uIU/ml), T4 normal, thyroglobulin appropriately low. Remains on levothyroxine 100 mcg/day????. \par \par Today she is without any complaints of twitches or paresthesias. She continues calcium supplementation????????. Denies neck mass, dysphagia, dyspnea, or hoarseness. Patient wants to join the army.

## 2019-01-07 NOTE — ASSESSMENT
[FreeTextEntry1] : IMP:\par T1aN0 right thyroid cancer July 2017 - PIPPA\par TSH low\par On Levothyroxine....?\par \par \par PLAN:\par Blood work now\par Continue calcium supplementation ( 500 mg twice daily)....?\par RTO q 6 months with bloodwork

## 2019-01-08 ENCOUNTER — APPOINTMENT (OUTPATIENT)
Dept: SURGICAL ONCOLOGY | Facility: CLINIC | Age: 24
End: 2019-01-08

## 2019-01-08 DIAGNOSIS — C73 MALIGNANT NEOPLASM OF THYROID GLAND: ICD-10-CM

## 2019-07-26 ENCOUNTER — EMERGENCY (EMERGENCY)
Facility: HOSPITAL | Age: 24
LOS: 1 days | Discharge: HOME | End: 2019-07-26
Attending: STUDENT IN AN ORGANIZED HEALTH CARE EDUCATION/TRAINING PROGRAM
Payer: MEDICAID

## 2019-07-26 VITALS
HEART RATE: 73 BPM | TEMPERATURE: 99 F | SYSTOLIC BLOOD PRESSURE: 105 MMHG | RESPIRATION RATE: 16 BRPM | DIASTOLIC BLOOD PRESSURE: 73 MMHG | HEIGHT: 66 IN | WEIGHT: 145.06 LBS | OXYGEN SATURATION: 100 %

## 2019-07-26 DIAGNOSIS — Z98.890 OTHER SPECIFIED POSTPROCEDURAL STATES: Chronic | ICD-10-CM

## 2019-07-26 LAB
ALBUMIN SERPL ELPH-MCNC: 3.7 G/DL — SIGNIFICANT CHANGE UP (ref 3.5–5)
ALP SERPL-CCNC: 61 U/L — SIGNIFICANT CHANGE UP (ref 40–120)
ALT FLD-CCNC: 17 U/L DA — SIGNIFICANT CHANGE UP (ref 10–60)
ANION GAP SERPL CALC-SCNC: 5 MMOL/L — SIGNIFICANT CHANGE UP (ref 5–17)
AST SERPL-CCNC: 8 U/L — LOW (ref 10–40)
BASOPHILS # BLD AUTO: 0.02 K/UL — SIGNIFICANT CHANGE UP (ref 0–0.2)
BASOPHILS NFR BLD AUTO: 0.2 % — SIGNIFICANT CHANGE UP (ref 0–2)
BILIRUB SERPL-MCNC: 0.2 MG/DL — SIGNIFICANT CHANGE UP (ref 0.2–1.2)
BUN SERPL-MCNC: 13 MG/DL — SIGNIFICANT CHANGE UP (ref 7–18)
CALCIUM SERPL-MCNC: 8.3 MG/DL — LOW (ref 8.4–10.5)
CHLORIDE SERPL-SCNC: 106 MMOL/L — SIGNIFICANT CHANGE UP (ref 96–108)
CO2 SERPL-SCNC: 28 MMOL/L — SIGNIFICANT CHANGE UP (ref 22–31)
CREAT SERPL-MCNC: 0.67 MG/DL — SIGNIFICANT CHANGE UP (ref 0.5–1.3)
EOSINOPHIL # BLD AUTO: 0.14 K/UL — SIGNIFICANT CHANGE UP (ref 0–0.5)
EOSINOPHIL NFR BLD AUTO: 1.6 % — SIGNIFICANT CHANGE UP (ref 0–6)
GLUCOSE SERPL-MCNC: 86 MG/DL — SIGNIFICANT CHANGE UP (ref 70–99)
HCG SERPL-ACNC: <1 MIU/ML — SIGNIFICANT CHANGE UP
HCT VFR BLD CALC: 37.1 % — SIGNIFICANT CHANGE UP (ref 34.5–45)
HGB BLD-MCNC: 11.7 G/DL — SIGNIFICANT CHANGE UP (ref 11.5–15.5)
IMM GRANULOCYTES NFR BLD AUTO: 0.2 % — SIGNIFICANT CHANGE UP (ref 0–1.5)
LYMPHOCYTES # BLD AUTO: 2.78 K/UL — SIGNIFICANT CHANGE UP (ref 1–3.3)
LYMPHOCYTES # BLD AUTO: 31.6 % — SIGNIFICANT CHANGE UP (ref 13–44)
MAGNESIUM SERPL-MCNC: 2.1 MG/DL — SIGNIFICANT CHANGE UP (ref 1.6–2.6)
MCHC RBC-ENTMCNC: 27.3 PG — SIGNIFICANT CHANGE UP (ref 27–34)
MCHC RBC-ENTMCNC: 31.5 GM/DL — LOW (ref 32–36)
MCV RBC AUTO: 86.7 FL — SIGNIFICANT CHANGE UP (ref 80–100)
MONOCYTES # BLD AUTO: 0.57 K/UL — SIGNIFICANT CHANGE UP (ref 0–0.9)
MONOCYTES NFR BLD AUTO: 6.5 % — SIGNIFICANT CHANGE UP (ref 2–14)
NEUTROPHILS # BLD AUTO: 5.27 K/UL — SIGNIFICANT CHANGE UP (ref 1.8–7.4)
NEUTROPHILS NFR BLD AUTO: 59.9 % — SIGNIFICANT CHANGE UP (ref 43–77)
NRBC # BLD: 0 /100 WBCS — SIGNIFICANT CHANGE UP (ref 0–0)
PLATELET # BLD AUTO: 244 K/UL — SIGNIFICANT CHANGE UP (ref 150–400)
POTASSIUM SERPL-MCNC: 3.7 MMOL/L — SIGNIFICANT CHANGE UP (ref 3.5–5.3)
POTASSIUM SERPL-SCNC: 3.7 MMOL/L — SIGNIFICANT CHANGE UP (ref 3.5–5.3)
PROT SERPL-MCNC: 7.6 G/DL — SIGNIFICANT CHANGE UP (ref 6–8.3)
RBC # BLD: 4.28 M/UL — SIGNIFICANT CHANGE UP (ref 3.8–5.2)
RBC # FLD: 13.3 % — SIGNIFICANT CHANGE UP (ref 10.3–14.5)
SODIUM SERPL-SCNC: 139 MMOL/L — SIGNIFICANT CHANGE UP (ref 135–145)
TSH SERPL-MCNC: 0.35 UU/ML — SIGNIFICANT CHANGE UP (ref 0.34–4.82)
WBC # BLD: 8.8 K/UL — SIGNIFICANT CHANGE UP (ref 3.8–10.5)
WBC # FLD AUTO: 8.8 K/UL — SIGNIFICANT CHANGE UP (ref 3.8–10.5)

## 2019-07-26 PROCEDURE — 84443 ASSAY THYROID STIM HORMONE: CPT

## 2019-07-26 PROCEDURE — 83735 ASSAY OF MAGNESIUM: CPT

## 2019-07-26 PROCEDURE — 80053 COMPREHEN METABOLIC PANEL: CPT

## 2019-07-26 PROCEDURE — 99285 EMERGENCY DEPT VISIT HI MDM: CPT

## 2019-07-26 PROCEDURE — 70450 CT HEAD/BRAIN W/O DYE: CPT | Mod: 26

## 2019-07-26 PROCEDURE — 99284 EMERGENCY DEPT VISIT MOD MDM: CPT | Mod: 25

## 2019-07-26 PROCEDURE — 96374 THER/PROPH/DIAG INJ IV PUSH: CPT

## 2019-07-26 PROCEDURE — 84702 CHORIONIC GONADOTROPIN TEST: CPT

## 2019-07-26 PROCEDURE — 70450 CT HEAD/BRAIN W/O DYE: CPT

## 2019-07-26 PROCEDURE — 85027 COMPLETE CBC AUTOMATED: CPT

## 2019-07-26 PROCEDURE — 36415 COLL VENOUS BLD VENIPUNCTURE: CPT

## 2019-07-26 RX ORDER — METOCLOPRAMIDE HCL 10 MG
10 TABLET ORAL ONCE
Refills: 0 | Status: COMPLETED | OUTPATIENT
Start: 2019-07-26 | End: 2019-07-26

## 2019-07-26 RX ORDER — SODIUM CHLORIDE 9 MG/ML
1000 INJECTION INTRAMUSCULAR; INTRAVENOUS; SUBCUTANEOUS ONCE
Refills: 0 | Status: COMPLETED | OUTPATIENT
Start: 2019-07-26 | End: 2019-07-26

## 2019-07-26 RX ADMIN — Medication 104 MILLIGRAM(S): at 22:47

## 2019-07-26 RX ADMIN — SODIUM CHLORIDE 1000 MILLILITER(S): 9 INJECTION INTRAMUSCULAR; INTRAVENOUS; SUBCUTANEOUS at 22:47

## 2019-07-26 NOTE — ED ADULT NURSE NOTE - OBJECTIVE STATEMENT
pt is here for dizziness.  pt stated that I have dizziness since last night with headache, headache 4-5/10, denied N/V/D or sob, denied chest pain, pt calm at this time with family member.

## 2019-07-27 NOTE — ED PROVIDER NOTE - OBJECTIVE STATEMENT
23 y.o prenseitng with dizziness with headache intermittent x 2 weeks. denies n, v, weaknes, cp, sob, neck stiffness, fever. has historyof thyroid ca s/p resection.

## 2019-07-27 NOTE — ED PROVIDER NOTE - PROGRESS NOTE DETAILS
patient ct negative, remains neuro intact. no sign of distress. given instruction to f.u pmd and return precaution

## 2019-07-27 NOTE — ED PROVIDER NOTE - CLINICAL SUMMARY MEDICAL DECISION MAKING FREE TEXT BOX
Patient presenting with headache, dizziness, neuro intact. no signs of meningitios or sah. given cancer history, will obtain ct head, r.o mass. labs, r/o anemia, r/o lyte abnormality

## 2019-11-14 NOTE — ED ADULT TRIAGE NOTE - NSWEIGHTCALCTOOLDRUG_GEN_A_CORE
used Implemented All Universal Safety Interventions:  [place] to call system. Call bell, personal items and telephone within reach. Instruct patient to call for assistance. Room bathroom lighting operational. Non-slip footwear when patient is off stretcher. Physically safe environment: no spills, clutter or unnecessary equipment. Stretcher in lowest position, wheels locked, appropriate side rails in place.

## 2019-12-10 NOTE — H&P PST ADULT - NEGATIVE ENMT SYMPTOMS
12/10/2019 [facility] 3, 4, 5 and 6  Group Chart Note - Co-facilitated with Paty Love, LPCC, LADC.  Number of clients attending the group:  12      Zackery Zamudio attended 1 hour DBT and Dual Process group covering the following topics Emotion Regulation.  Client was Engaged.  Client's response: client provided feedback to peers requesting to move up in program stages. He was also a part of a group discussion about the PLEASED skill, providing personal examples.      no tinnitus/no ear pain/no hearing difficulty

## 2020-07-22 ENCOUNTER — EMERGENCY (EMERGENCY)
Facility: HOSPITAL | Age: 25
LOS: 1 days | Discharge: HOME | End: 2020-07-22
Attending: EMERGENCY MEDICINE
Payer: MEDICAID

## 2020-07-22 VITALS
SYSTOLIC BLOOD PRESSURE: 132 MMHG | DIASTOLIC BLOOD PRESSURE: 90 MMHG | OXYGEN SATURATION: 100 % | HEART RATE: 98 BPM | HEIGHT: 64 IN | RESPIRATION RATE: 18 BRPM | WEIGHT: 165.35 LBS | TEMPERATURE: 98 F

## 2020-07-22 VITALS
OXYGEN SATURATION: 99 % | DIASTOLIC BLOOD PRESSURE: 73 MMHG | RESPIRATION RATE: 18 BRPM | HEART RATE: 72 BPM | TEMPERATURE: 98 F | SYSTOLIC BLOOD PRESSURE: 106 MMHG

## 2020-07-22 DIAGNOSIS — Z98.890 OTHER SPECIFIED POSTPROCEDURAL STATES: Chronic | ICD-10-CM

## 2020-07-22 LAB
APPEARANCE UR: CLEAR — SIGNIFICANT CHANGE UP
BACTERIA # UR AUTO: ABNORMAL /HPF
BILIRUB UR-MCNC: NEGATIVE — SIGNIFICANT CHANGE UP
COLOR SPEC: YELLOW — SIGNIFICANT CHANGE UP
DIFF PNL FLD: NEGATIVE — SIGNIFICANT CHANGE UP
EPI CELLS # UR: SIGNIFICANT CHANGE UP /HPF
GLUCOSE UR QL: NEGATIVE — SIGNIFICANT CHANGE UP
HCG UR QL: NEGATIVE — SIGNIFICANT CHANGE UP
KETONES UR-MCNC: NEGATIVE — SIGNIFICANT CHANGE UP
LEUKOCYTE ESTERASE UR-ACNC: NEGATIVE — SIGNIFICANT CHANGE UP
NITRITE UR-MCNC: NEGATIVE — SIGNIFICANT CHANGE UP
PH UR: 8 — SIGNIFICANT CHANGE UP (ref 5–8)
PROT UR-MCNC: NEGATIVE — SIGNIFICANT CHANGE UP
RBC CASTS # UR COMP ASSIST: SIGNIFICANT CHANGE UP /HPF (ref 0–2)
SP GR SPEC: 1.01 — SIGNIFICANT CHANGE UP (ref 1.01–1.02)
UROBILINOGEN FLD QL: NEGATIVE — SIGNIFICANT CHANGE UP
WBC UR QL: SIGNIFICANT CHANGE UP /HPF (ref 0–5)

## 2020-07-22 PROCEDURE — 81025 URINE PREGNANCY TEST: CPT

## 2020-07-22 PROCEDURE — 99283 EMERGENCY DEPT VISIT LOW MDM: CPT

## 2020-07-22 PROCEDURE — 87086 URINE CULTURE/COLONY COUNT: CPT

## 2020-07-22 PROCEDURE — 99284 EMERGENCY DEPT VISIT MOD MDM: CPT

## 2020-07-22 PROCEDURE — 81001 URINALYSIS AUTO W/SCOPE: CPT

## 2020-07-22 RX ORDER — CYCLOBENZAPRINE HYDROCHLORIDE 10 MG/1
1 TABLET, FILM COATED ORAL
Qty: 15 | Refills: 0
Start: 2020-07-22

## 2020-07-22 RX ORDER — CYCLOBENZAPRINE HYDROCHLORIDE 10 MG/1
10 TABLET, FILM COATED ORAL ONCE
Refills: 0 | Status: COMPLETED | OUTPATIENT
Start: 2020-07-22 | End: 2020-07-22

## 2020-07-22 RX ADMIN — Medication 500 MILLIGRAM(S): at 03:14

## 2020-07-22 RX ADMIN — CYCLOBENZAPRINE HYDROCHLORIDE 10 MILLIGRAM(S): 10 TABLET, FILM COATED ORAL at 03:14

## 2020-07-22 NOTE — ED PROVIDER NOTE - NSFOLLOWUPCLINICS_GEN_ALL_ED_FT
[facility]  [facility]  [address] - [address] [place] [address]  Phone: [phone]  Fax: [phone]  Follow Up Time:

## 2020-07-22 NOTE — ED PROVIDER NOTE - DURATION
PLAN  1. Coronary artery disease without angina. 2.  Hypertension which is well controlled. 3.  Continue current medications and return in six months. Orders Placed This Encounter   Procedures    ALT    AST    Lipid Panel     Order Specific Question:   Is Patient Fasting?/# of Hours     Answer:   yes     Orders Placed This Encounter   Medications    metoprolol tartrate (LOPRESSOR) 25 MG tablet     Sig: Take 1 tablet by mouth 2 times daily     Dispense:  180 tablet     Refill:  3    olmesartan-hydrochlorothiazide (BENICAR HCT) 40-25 MG per tablet     Sig: Take 1 tablet by mouth daily     Dispense:  90 tablet     Refill:  3    cloNIDine (CATAPRES) 0.1 MG tablet     Sig: Take 1 tablet by mouth 3 times daily as needed (as needed for blood pressure greater than 160/90)     Dispense:  30 tablet     Refill:  5     __________________________________  Zaynabhal Jose Manuel. [address] Ignacio M.D., Ph.D., F.A.C.C.   [facility]    cc (pcp): Bhupendra Hutson MD yesterday

## 2020-07-22 NOTE — ED ADULT TRIAGE NOTE - CHIEF COMPLAINT QUOTE
Pt compliant of left lower groin area started yesterday around 11a, denies any injuries, fever, cough or contact with anyone who had corona virus.

## 2020-07-22 NOTE — ED PROVIDER NOTE - CHIEF COMPLAINT
The patient is a 24y Female complaining of The patient is a 24y Female complaining of left groin pain

## 2020-07-22 NOTE — ED ADULT NURSE NOTE - NS ED NURSE LEVEL OF CONSCIOUSNESS SPEECH
Okay thank you so much. I have her on a waiting list as high priority   
Patient is calling to schedule and she is confused if she needs to see neuropsychology or neurology? I did schedule her for a neurology appointment.   
Starting with neurology would be fine. Family is very concerned about memory loss and behavior changes.  
Thank you  
Speaking Coherently

## 2020-07-22 NOTE — ED PROVIDER NOTE - NSFOLLOWUPINSTRUCTIONS_ED_ALL_ED_FT
You were seen today for your pain. It is likely related to your muscle. please take your medications as prescribed. Please return to the Emergency Department for worsening signs or symptoms.

## 2020-07-22 NOTE — ED PROVIDER NOTE - OBJECTIVE STATEMENT
24 year old female with PMHx of asthma and a thyroid nodule and PSHx of S/P thyroid surgery presents to the ED with complaints of     Allergies: Penicillin (anaphylaxis) 24 year old female with PMHx of asthma, hypothyroidism (on Levothyroxine and Calcitrol), and a thyroid nodule and PSHx of S/P thyroid surgery presents to the ED with complaints of left groin pain since yesterday. Patient states that she did not recently engage in any strenuous activities which she can relate to her symptoms. Patient denies any vaginal discharge, vaginal bleeding, nausea, vomiting, painful urination, diarrhea, and all other acute complaints. Patient reports that her last normal menstrual period was on 07/18. Patient notes that she did not take any medications for her pain prior to arrival.   Allergies: Penicillin (anaphylaxis) 24 year old female with PMHx of asthma, hypothyroidism (on Levothyroxine and Calcitrol), and a thyroid nodule and PSHx of S/P thyroid surgery presents to the ED with complaints of left groin pain since yesterday. States that she has been lifting boxes recently bc she is moving. Patient denies any vaginal discharge, vaginal bleeding, nausea, vomiting, painful urination, diarrhea, and all other acute complaints. Patient reports that her last normal menstrual period was on 07/18. Patient notes that she did not take any medications for her pain prior to arrival.   Allergies: Penicillin (anaphylaxis)

## 2020-07-22 NOTE — ED PROVIDER NOTE - PATIENT PORTAL LINK FT
You can access the FollowMyHealth Patient Portal offered by [facility] by registering at the following website: [email or web address]. By joining [facility]’s FollowMyHealth portal, you will also be able to view your health information using other applications (apps) compatible with our system.

## 2020-07-23 ENCOUNTER — APPOINTMENT (OUTPATIENT)
Dept: SURGERY | Facility: CLINIC | Age: 25
End: 2020-07-23
Payer: MEDICAID

## 2020-07-23 VITALS
TEMPERATURE: 97.4 F | HEIGHT: 66 IN | DIASTOLIC BLOOD PRESSURE: 75 MMHG | WEIGHT: 169.6 LBS | HEART RATE: 84 BPM | BODY MASS INDEX: 27.26 KG/M2 | SYSTOLIC BLOOD PRESSURE: 113 MMHG | OXYGEN SATURATION: 99 %

## 2020-07-23 DIAGNOSIS — R10.32 LEFT LOWER QUADRANT PAIN: ICD-10-CM

## 2020-07-23 DIAGNOSIS — Z78.9 OTHER SPECIFIED HEALTH STATUS: ICD-10-CM

## 2020-07-23 PROCEDURE — 99203 OFFICE O/P NEW LOW 30 MIN: CPT

## 2020-07-23 PROCEDURE — 99213 OFFICE O/P EST LOW 20 MIN: CPT

## 2020-07-23 RX ORDER — LEVOTHYROXINE SODIUM 0.17 MG/1
TABLET ORAL
Refills: 0 | Status: ACTIVE | COMMUNITY

## 2020-07-23 NOTE — PLAN
[FreeTextEntry1] : patient is presenting with  left inguinal pain for 2 days that is  most likely due to the muscle strain. no palpable hernias or masses. Ms. CLARK  was told significance of findings, options, risks and benefits were explained.    All surgical options were discussed including non-surgical treatments.  patient was advised to take NSAIDs for pain and return in symptoms do not improve.  avoid constipations.     I reviewed importance of behavioral modification. Nutrition was  reviewed and reinforced, including the importance  of making healthy food choices. The importance of maintaining regular exercise/physical activity also reinforced. Recommend patient to see nutritionist. \par Patient advised to seek immediate medical attention with any acute change in symptoms or with the development of any new or worsening symptoms.  Patient's questions and concerns addressed to patient's satisfaction, and patient verbalized an understanding of the information discussed.\par \par

## 2020-07-23 NOTE — CONSULT LETTER
[Dear  ___] : Dear  [unfilled], [Please see my note below.] : Please see my note below. [Consult Letter:] : I had the pleasure of evaluating your patient, [unfilled]. [Consult Closing:] : Thank you very much for allowing me to participate in the care of this patient.  If you have any questions, please do not hesitate to contact me. [Sincerely,] : Sincerely, [FreeTextEntry3] : Adriel Gary MD, FACS

## 2020-07-23 NOTE — HISTORY OF PRESENT ILLNESS
[de-identified] :  Ms. LAKESHIA CLARK  is  a 24 year   old patient  who was referred by Dr. Ambrose Smith with the chief complaint of having pain and swelling in her  Left   groin.  She has had the condition for 2 days  and is worse when she   is walking or standing.   she states [facility] with pain and was advised to see general surgeon as an outpatient.  . She denies any fever or  night sweats.  Appetite is good and weight is stable.   \par  \par

## 2020-07-23 NOTE — ASSESSMENT
[FreeTextEntry1] : Impression: left inguinal pain most likely due to the muscle strain. no palpable hernias or masses

## 2020-07-23 NOTE — PHYSICAL EXAM
[Oriented to Person] : oriented to person [Alert] : alert [Calm] : calm [Oriented to Time] : oriented to time [Oriented to Place] : oriented to place [Abdominal Masses] : No abdominal masses [de-identified] : She  is alert, well-groomed, and cheerful.\par   [Abdomen Tenderness] : ~T ~M No abdominal tenderness [de-identified] :   anicteric.  Nasal mucosa pink, septum midline. Oral mucosa pink.  Tongue midline, Pharynx without exudates.\par   [de-identified] : thyroidectomy scar  [de-identified] :   No evidence of hernia

## 2020-07-24 LAB
CULTURE RESULTS: SIGNIFICANT CHANGE UP
SPECIMEN SOURCE: SIGNIFICANT CHANGE UP

## 2022-04-01 NOTE — ED PROVIDER NOTE - CROS ED ENMT ALL NEG
Pt and her daughter given discharge instruction, medication reviewed with the pt, follow up appts reviewed. All questions and concerns addressed. Pt verbalized understanding. Discharge paperwork given to patient. put cardiac monitor device with box and discharge paperwork is the pt's belonging bag. Pt also educated on fall risk and remained free from falls/trauma/injury. Denies chest pain, SOB, palpitations, dizziness, pain, or discomfort. Plan of care reviewed with pt, all questions answered. Bed locked in lowest position, call bell within reach, no acute distress noted, Pt in room waiting for transport,will continue to monitor and  Will remove IV and Tele when transportation gets here.    negative...

## 2022-09-20 NOTE — ED ADULT TRIAGE NOTE - IDEAL BODY WEIGHT(KG)
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 Overall has improved with regular application of clobetasol ointment and regular use of moisturizing cream. Detail Level: Zone Render Risk Assessment In Note?: no 55

## 2022-12-15 NOTE — ED ADULT NURSE NOTE - PAIN RATING/NUMBER SCALE (0-10): REST
Show Anterior Platysmal Band Units: Yes Glabellar Complex Units: 20 Consent: Written consent obtained. Risks include but not limited to lid/brow ptosis, bruising, swelling, diplopia, temporary effect, incomplete chemical denervation. Additional Area 1 Location: tail of brow (per side) Additional Area 5 Units: 0 Show Right And Left Pupillary Line Units: No Post-Care Instructions: Patient instructed to not lie down for 4 hours and limit physical activity for 24 hours. Detail Level: Detailed Additional Area 2 Location: lateral canthus (per side) 7 Dilution (U/ 0.1cc): 12 Price (Use Numbers Only, No Special Characters Or $): 336.00 Additional Comments: 84 units in Dysport Forehead Units: 8

## 2023-01-28 NOTE — ED PROVIDER NOTE - CLINICAL SUMMARY MEDICAL DECISION MAKING FREE TEXT BOX
23 yo F of left groin pain s/p lifting boxes. Muscle hypertrophy and tenderness on exam. Improved with analgesia and muscle relaxers. Nontoxic and medically stable for discharge. Return precautions provided and patient understands to return to the ED for worsening signs and symptoms. Instructed to follow up with primary care physician and agreeable.
no

## 2023-07-26 NOTE — ED PROVIDER NOTE - PROGRESS NOTE DETAILS
The patient is a 67y Male complaining of palpitations. feels much better. mom arrived and has tsh results, reports pt had thyroidectomy in past. tsh <0.001. pt on 150 micrograms. has pmd to follow up with. otherwise labs she brought from outside look wnl. pt asking to be discharged home.

## 2024-05-05 NOTE — ASU PREOP CHECKLIST - LATEX ALLERGY
You can access the FollowMyHealth Patient Portal offered by [facility] by registering at the following website: [email or web address]. By joining [employer]’s FollowMyHealth portal, you will also be able to view your health information using other applications (apps) compatible with our system. no

## 2024-05-06 NOTE — H&P PST ADULT - MUSCULOSKELETAL
Librar called [employer].  She did save the end of the sensor box to rescan when her phone reupdated but it did not work, it told her to apply a new sensor.  Pt will continue with finger pokes and said she spoke to a DME and they will be calling her when her supply is sent.      sent a message to [employer] via [employer] requesting them to complete her order ASAP due to her sensor failure.      negative No joint pain, swelling or deformity; no limitation of movement

## 2024-08-09 NOTE — ED PROVIDER NOTE - SKIN, MLM
Accessed site: left axillary vein. Skin normal color for race, warm, dry and intact. No evidence of rash.

## 2024-08-26 NOTE — H&P PST ADULT - HEIGHT IN INCHES
Donna Neumann,  Okay to cardiac risk stratify.  She is on Eliquis. Hold was not requested but okay to hold 2 days prior to procedure.  Thanks, Ebony   4